# Patient Record
Sex: FEMALE | Race: WHITE | NOT HISPANIC OR LATINO | Employment: UNEMPLOYED | URBAN - METROPOLITAN AREA
[De-identification: names, ages, dates, MRNs, and addresses within clinical notes are randomized per-mention and may not be internally consistent; named-entity substitution may affect disease eponyms.]

---

## 2017-09-20 ENCOUNTER — HOSPITAL ENCOUNTER (OUTPATIENT)
Facility: MEDICAL CENTER | Age: 27
End: 2017-09-20
Attending: PHYSICIAN ASSISTANT
Payer: COMMERCIAL

## 2017-09-20 ENCOUNTER — OFFICE VISIT (OUTPATIENT)
Dept: MEDICAL GROUP | Facility: PHYSICIAN GROUP | Age: 27
End: 2017-09-20
Payer: COMMERCIAL

## 2017-09-20 VITALS
HEIGHT: 70 IN | SYSTOLIC BLOOD PRESSURE: 118 MMHG | OXYGEN SATURATION: 99 % | BODY MASS INDEX: 23.19 KG/M2 | RESPIRATION RATE: 16 BRPM | TEMPERATURE: 97.2 F | WEIGHT: 162 LBS | DIASTOLIC BLOOD PRESSURE: 74 MMHG | HEART RATE: 94 BPM

## 2017-09-20 DIAGNOSIS — Z12.4 SCREENING FOR CERVICAL CANCER: ICD-10-CM

## 2017-09-20 DIAGNOSIS — Z11.51 SCREENING FOR HPV (HUMAN PAPILLOMAVIRUS): ICD-10-CM

## 2017-09-20 DIAGNOSIS — Z01.419 ENCOUNTER FOR GYNECOLOGICAL EXAMINATION: ICD-10-CM

## 2017-09-20 PROCEDURE — 99385 PREV VISIT NEW AGE 18-39: CPT | Performed by: PHYSICIAN ASSISTANT

## 2017-09-20 PROCEDURE — 88175 CYTOPATH C/V AUTO FLUID REDO: CPT

## 2017-09-20 PROCEDURE — 87624 HPV HI-RISK TYP POOLED RSLT: CPT

## 2017-09-20 ASSESSMENT — PATIENT HEALTH QUESTIONNAIRE - PHQ9: CLINICAL INTERPRETATION OF PHQ2 SCORE: 0

## 2017-09-20 ASSESSMENT — PAIN SCALES - GENERAL: PAINLEVEL: NO PAIN

## 2017-09-20 NOTE — LETTER
[facility]  Ludivina Bnaks P.A.-C.  [address]  [place] [address]  Fax: [phone]   Authorization for Release/Disclosure of   Protected Health Information   Name: VENESSA ANDREWS : 1990 SSN: [id number]   Address: [address] [place] [address]  [place] [address] Phone:    [phone] (home)    I authorize the entity listed below to release/disclose the PHI below to:   [facility] [place]/Ludivina Banks P.A.-C. and Ludivina Banks P.A.-C.   Provider or Entity Name:     Address   City, State, Zip   Phone:      Fax:     Reason for request: continuity of care   Information to be released:    [  ] LAST COLONOSCOPY,  including any PATH REPORT and follow-up  [  ] LAST FIT/COLOGUARD RESULT [  ] LAST DEXA  [  ] LAST MAMMOGRAM  [  ] LAST PAP  [  ] LAST LABS [  ] RETINA EXAM REPORT  [  ] IMMUNIZATION RECORDS  [ x ] Release all info      [  ] Check here and initial the line next to each item to release ALL health information INCLUDING  _____ Care and treatment for drug and / or alcohol abuse  _____ HIV testing, infection status, or AIDS  _____ Genetic Testing    DATES OF SERVICE OR TIME PERIOD TO BE DISCLOSED: _____________  I understand and acknowledge that:  * This Authorization may be revoked at any time by you in writing, except if your health information has already been used or disclosed.  * Your health information that will be used or disclosed as a result of you signing this authorization could be re-disclosed by the recipient. If this occurs, your re-disclosed health information may no longer be protected by State or Federal laws.  * You may refuse to sign this Authorization. Your refusal will not affect your ability to obtain treatment.  * This Authorization becomes effective upon signing and will  on (date) __________.      If no date is indicated, this Authorization will  one (1) year from the signature date.    Name: Venessa Andrews    Signature:   Date:     2017       PLEASE FAX REQUESTED  RECORDS BACK TO: [phone]

## 2017-09-20 NOTE — PROGRESS NOTES
SUBJECTIVE: 27 y.o. female for annual routine gynecologic exam  Chief Complaint   Patient presents with   • Establish Care     Pap       Obstetric History     No data available      Last Pap: 2 years ago last well woman check. 2 years 9 months ago was normal pap. States prior to that pap the 3 previous paps were abnormal  History   Sexual Activity   • Sexual activity: Yes   • Partners: Male     Sexual history: currently sexually active, single partner, heterosexual, previous pregnancy, regular gynecologic visits   H/O Abnormal Pap yes  She  reports that she has never smoked. She has never used smokeless tobacco.    LMP Date: 09/06/17   Allergies: Review of patient's allergies indicates not on file.     ROS:    Reports no menopause symptoms of hot flashes, night sweats, sleep disruption, mood changes.Denies vaginal dryness.   Menses every month with 7 days light, moderate, heavy bleeding.  Cramping is mild. States after vaginal birth of daughter 2 years ago cramping has improved. Prior to child birth cramping was heavy.  She does take OTC analgesics for cramping during days 1-3 of menstrual cycle.   No significant bloating/fluid retention, pelvic pain, or dyspareunia. No vaginal discharge.  No breast tenderness, mass, nipple discharge, changes in size or contour, or abnormal cyclic discomfort.   No urinary tract symptoms, no incontinence, no polydipsia, polyuria,  No abdominal pain, change in bowel habits, black or bloody stools.    No unusual headaches, no visual changes, menstrual migraines   No prolonged cough. No dyspnea or chest pain on exertion.  No depression, labile mood, anxiety, libido changes, insomnia.  No temperature intolerance.  No new/concerning skin lesions, concerns.     States while breast feeding she developed the sensation of being engorged that was relieved once she breast fed. States she has not been breasting feeding for 5 months but was developing the sensation of engorgement intermittently  "up until 3 weeks ago. States when she would express small amounts of nipple discharge and sensation would subside. Denies erythema/warmth of bilateral breast. Denies fever, chills, nausea, vomiting.    Exercise: moderate regular exercise program States she does yoga 2-3 times per week, 30 minutes per time.   Preventive Care: Calmagzinc and Vitamin C supplements.     Current medicines (including changes today)  No current outpatient prescriptions on file.     No current facility-administered medications for this visit.      She  has no past medical history on file.  She  has a past surgical history that includes dental extraction(s) (Bilateral).     Family History:   Family History   Problem Relation Age of Onset   • Thyroid Mother    • Other Mother      Sinus polyps   • Asthma Father    • No Known Problems Sister    • Hyperlipidemia Maternal Grandmother    • Alzheimer's Disease Maternal Grandfather    • No Known Problems Sister        OBJECTIVE:   /74   Pulse 94   Temp 36.2 °C (97.2 °F)   Resp 16   Ht 1.778 m (5' 10\")   Wt 73.5 kg (162 lb)   LMP 09/06/2017   SpO2 99%   Breastfeeding? No   BMI 23.24 kg/m²   Body mass index is 23.24 kg/m².    Exam: Blood pressure 118/74, pulse 94, temperature 36.2 °C (97.2 °F), resp. rate 16, height 1.778 m (5' 10\"), weight 73.5 kg (162 lb), last menstrual period 09/06/2017, SpO2 99 %, not currently breastfeeding.  General: Normal appearing. No distress.  HEENT: Normocephalic. Eyes conjunctiva clear lids without ptosis, pupils equal and reactive to light accommodation, ears normal shape and contour, canals are clear bilaterally, tympanic membranes are benign, nasal mucosa benign, oropharynx is without erythema, edema or exudates.   Neck: Supple without JVD or bruit. Thyroid is not enlarged.  Pulmonary: Clear to ausculation.  Normal effort. No rales, ronchi, or wheezing.  Cardiovascular: Regular rate and rhythm without murmur. Carotid and radial pulses are intact and " equal bilaterally.  Abdomen: Soft, nontender, nondistended. Normal bowel sounds. Liver and spleen are not palpable  Neurologic: Grossly nonfocal.  Cranial nerves are normal. DTR's normal and symmetric.  Lymph: No cervical, supraclavicular or axillary lymph nodes are palpable  Skin: Warm and dry.  No rashes or suspicious skin lesions.  Musculoskeletal: Normal gait. No extremity cyanosis, clubbing, or edema.  Psych: Normal mood and affect. Alert and oriented x3. Judgment and insight is normal.     Breast Exam: Performed with instruction during examination. No axillary lymphadenopathy, no skin changes, no dominant masses. No nipple retraction  Pelvic Exam -  Normal external genitalia with no lesions. Vaginal Mucosa:  normal vaginal mucosa, normal discharge . Cervix with no visible lesions. No cervical motion tenderness. Uterus is normal sized with no masses. No adnexal tenderness or enlargement appreciated. Thin Prep Pap is obtained, vaginal swab is obtained and specimen(s) sent to lab  Rectal: deferred    <ASSESSMENT and PLAN>  1. Encounter for gynecological examination     2. Screening for cervical cancer  THINPREP PAP WITH HPV   3. Screening for HPV (human papillomavirus)  THINPREP PAP WITH HPV       Discussed  breast self exam, STD prevention, HIV risk factors and prevention, feminine hygiene, adequate intake of calcium and vitamin D, diet and exercise   Follow-up in 1 years for next Gyn exam and 3 years for next Pap.   Next office visit for recheck of chronic medical conditions is due in 6 months

## 2017-09-21 LAB
CYTOLOGY REG CYTOL: NORMAL
HPV HR 12 DNA CVX QL NAA+PROBE: NEGATIVE
HPV16 DNA SPEC QL NAA+PROBE: NEGATIVE
HPV18 DNA SPEC QL NAA+PROBE: NEGATIVE
SPECIMEN SOURCE: NORMAL

## 2017-09-22 ENCOUNTER — TELEPHONE (OUTPATIENT)
Dept: MEDICAL GROUP | Facility: PHYSICIAN GROUP | Age: 27
End: 2017-09-22

## 2017-09-22 NOTE — TELEPHONE ENCOUNTER
----- Message from Ludivina Banks P.A.-C. sent at 9/21/2017  5:29 PM PDT -----  Please call patient. The results of their most recent Pap smear are normal.     Thank you,    Joan PRETTY

## 2017-12-20 ENCOUNTER — HOSPITAL ENCOUNTER (OUTPATIENT)
Dept: RADIOLOGY | Facility: MEDICAL CENTER | Age: 27
End: 2017-12-20
Attending: MIDWIFE
Payer: COMMERCIAL

## 2017-12-20 DIAGNOSIS — Z36.9 1ST TRIMESTER SCREENING: ICD-10-CM

## 2017-12-20 PROCEDURE — 76817 TRANSVAGINAL US OBSTETRIC: CPT

## 2018-02-22 ENCOUNTER — HOSPITAL ENCOUNTER (OUTPATIENT)
Dept: RADIOLOGY | Facility: MEDICAL CENTER | Age: 28
End: 2018-02-22
Attending: MIDWIFE
Payer: COMMERCIAL

## 2018-02-22 DIAGNOSIS — Z3A.20 20 WEEKS GESTATION OF PREGNANCY: ICD-10-CM

## 2018-02-22 PROCEDURE — 76805 OB US >/= 14 WKS SNGL FETUS: CPT

## 2018-03-22 ENCOUNTER — OFFICE VISIT (OUTPATIENT)
Dept: MEDICAL GROUP | Facility: MEDICAL CENTER | Age: 28
End: 2018-03-22
Payer: COMMERCIAL

## 2018-03-22 VITALS
DIASTOLIC BLOOD PRESSURE: 64 MMHG | OXYGEN SATURATION: 93 % | WEIGHT: 162 LBS | TEMPERATURE: 98.2 F | HEART RATE: 89 BPM | BODY MASS INDEX: 23.19 KG/M2 | SYSTOLIC BLOOD PRESSURE: 102 MMHG | HEIGHT: 70 IN

## 2018-03-22 DIAGNOSIS — J02.9 SORE THROAT: ICD-10-CM

## 2018-03-22 LAB
INT CON NEG: NEGATIVE
INT CON POS: POSITIVE
S PYO AG THROAT QL: NEGATIVE

## 2018-03-22 PROCEDURE — 99213 OFFICE O/P EST LOW 20 MIN: CPT | Performed by: PHYSICIAN ASSISTANT

## 2018-03-22 PROCEDURE — 87880 STREP A ASSAY W/OPTIC: CPT | Performed by: PHYSICIAN ASSISTANT

## 2018-03-22 NOTE — PROGRESS NOTES
"Chief Complaint   Patient presents with   • Pharyngitis     congestion x 1 week       HPI:  Ashwini Andrews is a 27 y.o. Female , 25 wks pregnant here for new onset sore throat X 1 wks. Sore throat is not improving and she started to have hoarse voice couple days ago. Also she has nasal and sinus congestion and has a lot of mucus. No cough, no fever or chills, no bodyaches, pos rhinorrhea. No sick contact. She has a 2 yr daughter who stays home with her.    Past medical, surgical, family, and social history is reviewed and updated in Epic chart by me today.   Medications and allergies reviewed and updated in Epic chart by me today.       ROS:   As documented in history of present illness above      Exam:  Blood pressure 102/64, pulse 89, temperature 36.8 °C (98.2 °F), height 1.778 m (5' 10\"), weight 73.5 kg (162 lb), SpO2 93 %, not currently breastfeeding.    Constitutional: Alert, no distress.  Skin: Warm, dry, no rashes in visible areas.  Eye:conjunctiva clear without injection, no discharge,  lids normal.  ENMT: Lips without lesions, oropharynx clear, no tonsilar exudates.   Nose: nares patent, septum not deviated ,no discharge,   Ear: TM pearly gray bilaterally w/o perforation, no effusion or pus behind TMs, Cone of light was visualized w/o distortion.   Neck: Trachea midline, no masses, no thyromegaly. No cervical or supraclavicular lymphadenopathy  Respiratory: Unlabored respiratory effort, lungs clear to auscultation, no wheezes, no ronchi.  Cardiovascular: Normal S1, S2, no murmur,   Psych: Alert and oriented x3, normal affect and mood.            A/P:  1. Sore throat  Patient is afebrile, no cough with stable vital signs, lungs clear to auscultation  Patient is 25 weeks pregnant. Advised patient to wait another week.  - POCT Rapid Strep A --. neg     Recommend saline nasal sprays, adequate water intake, and rest. The patient will follow up within 7days if not improving, sooner with any " worsening.

## 2018-05-05 ENCOUNTER — HOSPITAL ENCOUNTER (OUTPATIENT)
Facility: MEDICAL CENTER | Age: 28
End: 2018-05-05
Attending: OBSTETRICS & GYNECOLOGY | Admitting: OBSTETRICS & GYNECOLOGY
Payer: COMMERCIAL

## 2018-05-05 VITALS
HEIGHT: 70 IN | BODY MASS INDEX: 26.05 KG/M2 | HEART RATE: 85 BPM | DIASTOLIC BLOOD PRESSURE: 85 MMHG | SYSTOLIC BLOOD PRESSURE: 132 MMHG | TEMPERATURE: 98.1 F | WEIGHT: 182 LBS

## 2018-05-05 LAB
APPEARANCE UR: CLEAR
COLOR UR AUTO: YELLOW
GLUCOSE UR QL STRIP.AUTO: NEGATIVE MG/DL
KETONES UR QL STRIP.AUTO: NEGATIVE MG/DL
LEUKOCYTE ESTERASE UR QL STRIP.AUTO: NEGATIVE
NITRITE UR QL STRIP.AUTO: NEGATIVE
PH UR STRIP.AUTO: 7.5 [PH]
PROT UR QL STRIP: NEGATIVE MG/DL
RBC UR QL AUTO: NEGATIVE
SP GR UR: 1.01

## 2018-05-05 PROCEDURE — 700111 HCHG RX REV CODE 636 W/ 250 OVERRIDE (IP): Performed by: NURSE PRACTITIONER

## 2018-05-05 PROCEDURE — 59025 FETAL NON-STRESS TEST: CPT | Performed by: NURSE PRACTITIONER

## 2018-05-05 PROCEDURE — 81002 URINALYSIS NONAUTO W/O SCOPE: CPT

## 2018-05-05 PROCEDURE — 96372 THER/PROPH/DIAG INJ SC/IM: CPT

## 2018-05-05 RX ORDER — TERBUTALINE SULFATE 1 MG/ML
0.25 INJECTION, SOLUTION SUBCUTANEOUS ONCE
Status: COMPLETED | OUTPATIENT
Start: 2018-05-05 | End: 2018-05-05

## 2018-05-05 RX ORDER — TERBUTALINE SULFATE 1 MG/ML
INJECTION, SOLUTION SUBCUTANEOUS
Status: DISCONTINUED
Start: 2018-05-05 | End: 2018-05-05 | Stop reason: HOSPADM

## 2018-05-05 RX ADMIN — TERBUTALINE SULFATE 0.25 MG: 1 INJECTION, SOLUTION SUBCUTANEOUS at 20:10

## 2018-05-06 NOTE — PROGRESS NOTES
"27 y.o. , EDC     Pt presents to L&D c/o painful UCs that are every 3-4 minutes and started about 2 hours ago. She took a bath and now only feels occasional tightening. States she was told to come in to be evaluated by her midwife. States she wasn't on her feet much and has been drinking water today. UA results=WNL.     -A Helio CNM updated and reviewed tracing. Order for FFN and SVE. Pt had intercourse this morning, FFN not performed. SVE=closed/thick. CNM updated. Order received to give Terbutaline  -pt states her UCs have decreased in frequency and \"don't make my belly as tight anymore.\" pt requesting to go home.   -pt discharged in stable condition with labor precautions/discharge instructions. All questions answered  "

## 2018-06-05 ENCOUNTER — HOSPITAL ENCOUNTER (OUTPATIENT)
Dept: RADIOLOGY | Facility: MEDICAL CENTER | Age: 28
End: 2018-06-05
Attending: MIDWIFE
Payer: COMMERCIAL

## 2018-06-05 DIAGNOSIS — Z34.93 ENCOUNTER FOR SUPERVISION OF NORMAL PREGNANCY IN THIRD TRIMESTER, UNSPECIFIED GRAVIDITY: ICD-10-CM

## 2018-06-05 PROCEDURE — 76815 OB US LIMITED FETUS(S): CPT

## 2018-07-16 ENCOUNTER — APPOINTMENT (OUTPATIENT)
Dept: RADIOLOGY | Facility: MEDICAL CENTER | Age: 28
End: 2018-07-16
Attending: MIDWIFE
Payer: COMMERCIAL

## 2019-01-29 ENCOUNTER — APPOINTMENT (OUTPATIENT)
Dept: MEDICAL GROUP | Facility: PHYSICIAN GROUP | Age: 29
End: 2019-01-29
Payer: COMMERCIAL

## 2019-01-29 ENCOUNTER — OFFICE VISIT (OUTPATIENT)
Dept: URGENT CARE | Facility: PHYSICIAN GROUP | Age: 29
End: 2019-01-29
Payer: COMMERCIAL

## 2019-01-29 VITALS
SYSTOLIC BLOOD PRESSURE: 104 MMHG | HEART RATE: 89 BPM | HEIGHT: 70 IN | RESPIRATION RATE: 12 BRPM | OXYGEN SATURATION: 98 % | WEIGHT: 189 LBS | BODY MASS INDEX: 27.06 KG/M2 | DIASTOLIC BLOOD PRESSURE: 80 MMHG | TEMPERATURE: 97.4 F

## 2019-01-29 DIAGNOSIS — M25.512 ACUTE PAIN OF LEFT SHOULDER: ICD-10-CM

## 2019-01-29 PROCEDURE — 99204 OFFICE O/P NEW MOD 45 MIN: CPT | Performed by: EMERGENCY MEDICINE

## 2019-01-29 RX ORDER — IBUPROFEN 200 MG
200 TABLET ORAL EVERY 6 HOURS PRN
COMMUNITY

## 2019-01-30 NOTE — PROGRESS NOTES
"Subjective:      Ashwini Andrews is a 28 y.o. female who presents with Shoulder Pain (states bilateral shoulder pain, right more then the left x 1 month)            HPI  Patient is a 28-year-old female complaining of bilateral shoulder pain that has been going on for approximately a month with the right more uncomfortable than the left.  Patient carries up to 35 pounds of food prior tray is a  but declines at this is work related.  PMH:  has no past medical history on file.  MEDS:   Current Outpatient Prescriptions:   •  ibuprofen (MOTRIN) 200 MG Tab, Take 200 mg by mouth every 6 hours as needed., Disp: , Rfl:   ALLERGIES: No Known Allergies  SURGHX:   Past Surgical History:   Procedure Laterality Date   • DENTAL EXTRACTION(S) Bilateral     11 y/o     SOCHX:  reports that she has never smoked. She has never used smokeless tobacco. She reports that she drinks about 0.6 oz of alcohol per week . She reports that she does not use drugs.  FH: Reviewed with patient, not pertinent to this visit.   Review of Systems   Constitutional: Negative for chills and fever.   HENT: Negative for congestion.    Eyes: Negative for discharge and redness.   Respiratory: Negative for sputum production and shortness of breath.    Cardiovascular: Negative for chest pain.   Gastrointestinal: Negative for abdominal pain, nausea and vomiting.   Genitourinary: Negative for dysuria and urgency.   Musculoskeletal: Negative for neck pain.   Skin: Negative for rash.   Neurological: Negative for sensory change and speech change.   Psychiatric/Behavioral: The patient is not nervous/anxious.           Objective:     /80 (BP Location: Left arm, Patient Position: Sitting, BP Cuff Size: Adult)   Pulse 89   Temp 36.3 °C (97.4 °F) (Tympanic)   Resp 12   Ht 1.778 m (5' 10\")   Wt 85.7 kg (189 lb)   LMP 01/28/2019 (Exact Date)   SpO2 98%   Breastfeeding? Yes   BMI 27.12 kg/m²      Physical Exam   Constitutional: She appears " well-developed and well-nourished. No distress.   HENT:   Head: Normocephalic and atraumatic.   Right Ear: External ear normal.   Left Ear: External ear normal.   Eyes: Right eye exhibits no discharge. Left eye exhibits no discharge.   Neck: Normal range of motion.   Cardiovascular: Normal rate and regular rhythm.    Pulmonary/Chest: Effort normal.   Musculoskeletal: Normal range of motion. She exhibits tenderness. She exhibits no edema or deformity.   Patient's discomfort is primarily localized to her left shoulder.  She can duct flex and extend with good range of motion but all ranges of motion there is pain.  He has no neurovascular compromise extremity distal to her most painful left shoulder.   Skin: Skin is warm.   Psychiatric: She has a normal mood and affect.   Nursing note and vitals reviewed.         Patient does not have time to get her x-ray today but will come back in and get it as an outpatient I will call her with results.     Assessment/Plan:     Diagnosis acute left shoulder discomfort.  Patient is to follow-up with her primary care provider.  Will obtain a x-ray of her left shoulder has been given referral to orthopedic surgery.

## 2019-01-31 ASSESSMENT — ENCOUNTER SYMPTOMS
EYE DISCHARGE: 0
CHILLS: 0
FEVER: 0
NERVOUS/ANXIOUS: 0
VOMITING: 0
NECK PAIN: 0
SPUTUM PRODUCTION: 0
SHORTNESS OF BREATH: 0
SENSORY CHANGE: 0
SPEECH CHANGE: 0
ABDOMINAL PAIN: 0
NAUSEA: 0
EYE REDNESS: 0

## 2019-04-29 ENCOUNTER — OFFICE VISIT (OUTPATIENT)
Dept: MEDICAL GROUP | Facility: PHYSICIAN GROUP | Age: 29
End: 2019-04-29
Payer: COMMERCIAL

## 2019-04-29 VITALS
DIASTOLIC BLOOD PRESSURE: 74 MMHG | SYSTOLIC BLOOD PRESSURE: 112 MMHG | WEIGHT: 197 LBS | TEMPERATURE: 98.4 F | BODY MASS INDEX: 28.2 KG/M2 | HEART RATE: 102 BPM | RESPIRATION RATE: 14 BRPM | HEIGHT: 70 IN | OXYGEN SATURATION: 98 %

## 2019-04-29 DIAGNOSIS — K62.89 ANAL IRRITATION: ICD-10-CM

## 2019-04-29 DIAGNOSIS — Z00.00 ANNUAL PHYSICAL EXAM: ICD-10-CM

## 2019-04-29 PROCEDURE — 99202 OFFICE O/P NEW SF 15 MIN: CPT | Performed by: INTERNAL MEDICINE

## 2019-04-29 ASSESSMENT — PATIENT HEALTH QUESTIONNAIRE - PHQ9: CLINICAL INTERPRETATION OF PHQ2 SCORE: 0

## 2019-04-29 NOTE — PROGRESS NOTES
"PRIMARY CARE CLINIC FOLLOW UP VISIT  Chief Complaint   Patient presents with   • Anal Irritation     Poss Lump      History of Present Illness     Anal irritation  Having some anal irritation at least before her most recent pregnancy, her youngest is now 9 months old. Denies pain, blood in stool. She had a home birth with her youngest baby, now 9 months. Since then she's also been having perineal pain on the second day of her periods. Had a second degree tear after her home birth.     Current Outpatient Prescriptions   Medication Sig Dispense Refill   • ibuprofen (MOTRIN) 200 MG Tab Take 200 mg by mouth every 6 hours as needed.       No current facility-administered medications for this visit.      History reviewed. No pertinent past medical history.  Past Surgical History:   Procedure Laterality Date   • DENTAL EXTRACTION(S) Bilateral     13 y/o     Social History   Substance Use Topics   • Smoking status: Never Smoker   • Smokeless tobacco: Never Used   • Alcohol use Yes     Social History     Social History Narrative    Homemaker      Family History   Problem Relation Age of Onset   • Thyroid Mother    • Other Mother         Sinus polyps   • Asthma Father    • No Known Problems Sister    • Hyperlipidemia Maternal Grandmother    • Alzheimer's Disease Maternal Grandfather    • No Known Problems Sister      Family Status   Relation Status   • Mo Alive   • Fa Alive   • Sis Alive   • MGMo Alive   • MGFa    • PGMo    • PGFa    • Sis Alive     Allergies: Patient has no known allergies.    ROS  As per HPI above. All other systems reviewed and negative.        Objective   /74   Pulse (!) 102   Temp 36.9 °C (98.4 °F)   Resp 14   Ht 1.778 m (5' 10\")   Wt 89.4 kg (197 lb)   SpO2 98%  Body mass index is 28.27 kg/m².    General: alert and oriented, pleasant, cooperative  HEENT: Normocephalic, atraumatic. No thyroid masses. Oropharynx clear without exudate or injection.   Cardiovascular: " regular rate and rhythm, normal S1/S2  Pulmonary: lungs clear to auscultation bilaterally  Gastrointestinal: no tenderness to palpation. No hepatosplenomegaly. Bowel sounds normoactive  Lymphatics: no cervical or supraclavicular lymphadenopathy   Skin: warm and dry, no lesions or rashes  : normal appearing labia majora, minora. No skin tags of perineum or external hemorrhoids noted   Psychiatric: appropriate mood and affect. Good insight and appropriate judgment     Assessment and Plan   The following treatment plan was discussed     1. Anal irritation  Perhaps scar tissue since her recent home birth, given that many of her symptoms (including worse pain after periods) did seem to start then. However, no skin tags or external hemorrhoids noted today.     2. Annual physical exam  - Comp Metabolic Panel; Future  - CBC WITH DIFFERENTIAL; Future  - Lipid Profile; Future  - VITAMIN D,25 HYDROXY; Future    Healthcare maintenance     There are no preventive care reminders to display for this patient.    Return in about 1 year (around 4/29/2020).    Ronni Reyna MD  Internal Medicine  [facility]

## 2019-04-29 NOTE — ASSESSMENT & PLAN NOTE
Having some anal irritation at least before her most recent pregnancy, her youngest is now 9 months old. Denies pain, blood in stool. She had a home birth with her youngest baby, now 9 months. Since then she's also been having perineal pain on the second day of her periods. Had a second degree tear after her home birth.